# Patient Record
Sex: FEMALE | Race: WHITE | URBAN - METROPOLITAN AREA
[De-identification: names, ages, dates, MRNs, and addresses within clinical notes are randomized per-mention and may not be internally consistent; named-entity substitution may affect disease eponyms.]

---

## 2017-01-26 ENCOUNTER — APPOINTMENT (RX ONLY)
Dept: URBAN - METROPOLITAN AREA CLINIC 41 | Facility: CLINIC | Age: 37
Setting detail: DERMATOLOGY
End: 2017-01-26

## 2017-01-26 DIAGNOSIS — Z41.9 ENCOUNTER FOR PROCEDURE FOR PURPOSES OTHER THAN REMEDYING HEALTH STATE, UNSPECIFIED: ICD-10-CM

## 2017-01-26 PROCEDURE — ? OTHER (COSMETIC)

## 2017-01-26 PROCEDURE — ? PRODUCT LINE (PHARMACEUTICALS)

## 2017-01-26 PROCEDURE — ? COSMETIC CONSULTATION: BOTULINUM TOXIN

## 2017-01-26 PROCEDURE — ? MEDICAL CONSULTATION: FILLERS

## 2017-01-26 PROCEDURE — ? BOTOX

## 2017-01-26 NOTE — PROCEDURE: PRODUCT LINE (PHARMACEUTICALS)
Product 12 Price (In Dollars - Numeric Only, No Special Characters Or $): 50.00
Product 2 Application Directions: Apply to affected areas 1-2 times daily as needed for flares. Patient may have 3 refills
Product 41 Units: 0
Name Of Product 7: Seborrheic Dermatitis Cream
Product 45 Price (In Dollars - Numeric Only, No Special Characters Or $): 0.00
Name Of Product 11: Clobetasol Cream
Product 6 Price (In Dollars - Numeric Only, No Special Characters Or $): 40.00
Name Of Product 2: Clobetasol Foam
Name Of Product 10: Tretinoin 0.1% Cream
Product 10 Application Directions: Apply to affected areas nightly
Product 3 Application Directions: Apply to affected areas 1-2 times daily as needed for flares
Name Of Product 8: Tretinoin 0.025% Cream
Product 5 Application Directions: Apply to affected areas nightly as tolerated, if too drying reduce to every other night. Patient may have 6 refills.
Product 8 Units: 1
Product 11 Application Directions: Apply to affected areas twice daily for 2-3 weeks and then as needed for flares. Patient may have 3 refills
Product 8 Application Directions: Apply to affected areas nightly, if too irritating may reduce use to every other night. Patient may have 6 refills.
Render Product Pricing In Note: Yes
Name Of Product 9: Tretinoin 0.05% Cream
Name Of Product 12: Tacrolimus ointment
Product 6 Application Directions: Apply to face twice daily as tolerated, if too irritating may reduce to every other night. Patient may have 5 refills.
Product 12 Application Directions: Apply once daily to the eyelids as needed for irritation.
Name Of Product 3: Clobetasol Shampoo
Name Of Product 5: Melasma Emulsion
Name Of Product 4: Clobetasol Solution
Detail Level: Simple
Product 1 Application Directions: Apply to the face once at night with moisturizer. Pt. is allotted 6 refills.
Product 1 Price (In Dollars - Numeric Only, No Special Characters Or $): 40
Name Of Product 1: Acne Triple Gel
Name Of Product 6: Rosacea Triple Gel
Product 4 Application Directions: Apply to affected areas 2 times daily and then as needed for flares.
Product 9 Application Directions: Apply to affected areas nightly. Patient may have 3 refills.

## 2017-01-26 NOTE — PROCEDURE: OTHER (COSMETIC)
Detail Level: Simple
Other (Free Text): Also discussed starting with a little voluma to the cheeks and if needed a little juvederm ultra plus to no fold but likely just voluma. Also briefly talked about microneedling.

## 2017-01-26 NOTE — PROCEDURE: BOTOX
Periorbital Skin Units: 4
Inferior Lateral Orbicularis Oculi Units: 0
Lot #: [id number]
Post-Care Instructions: Patient instructed to not lie down for 4 hours and limit physical activity for 24 hours. Patient instructed not to travel by airplane for 48 hours.
Consent: Written consent obtained. Risks include but not limited to lid/brow ptosis, bruising, swelling, diplopia, temporary effect, incomplete chemical denervation.
Detail Level: Detailed
Expiration Date (Month Year): 08/2019
Dilution (U/0.1 Cc): 1
Glabellar Complex Units: 18
Forehead Units: 10